# Patient Record
Sex: FEMALE | URBAN - METROPOLITAN AREA
[De-identification: names, ages, dates, MRNs, and addresses within clinical notes are randomized per-mention and may not be internally consistent; named-entity substitution may affect disease eponyms.]

---

## 2019-03-04 ENCOUNTER — APPOINTMENT (RX ONLY)
Dept: URBAN - METROPOLITAN AREA CLINIC 106 | Facility: CLINIC | Age: 49
Setting detail: DERMATOLOGY
End: 2019-03-04

## 2019-03-04 DIAGNOSIS — L23.9 ALLERGIC CONTACT DERMATITIS, UNSPECIFIED CAUSE: ICD-10-CM

## 2019-03-04 DIAGNOSIS — L71.8 OTHER ROSACEA: ICD-10-CM

## 2019-03-04 PROCEDURE — 99213 OFFICE O/P EST LOW 20 MIN: CPT

## 2019-03-04 PROCEDURE — ? COUNSELING

## 2019-03-04 PROCEDURE — ? PRESCRIPTION

## 2019-03-04 PROCEDURE — ? ORDER TESTS

## 2019-03-04 PROCEDURE — ? TREATMENT REGIMEN

## 2019-03-04 RX ORDER — IVERMECTIN 10 MG/G
CREAM TOPICAL
Qty: 1 | Refills: 5 | Status: ERX | COMMUNITY
Start: 2019-03-04

## 2019-03-04 RX ORDER — PIMECROLIMUS 10 MG/G
CREAM TOPICAL
Qty: 1 | Refills: 5 | Status: ERX | COMMUNITY
Start: 2019-03-04

## 2019-03-04 RX ORDER — DOXYCYCLINE 40 MG/1
CAPSULE ORAL
Qty: 30 | Refills: 5 | Status: ERX | COMMUNITY
Start: 2019-03-04

## 2019-03-04 RX ADMIN — IVERMECTIN: 10 CREAM TOPICAL at 00:00

## 2019-03-04 RX ADMIN — DOXYCYCLINE: 40 CAPSULE ORAL at 00:00

## 2019-03-04 RX ADMIN — PIMECROLIMUS: 10 CREAM TOPICAL at 00:00

## 2019-03-04 ASSESSMENT — LOCATION ZONE DERM: LOCATION ZONE: FACE

## 2019-03-04 ASSESSMENT — LOCATION SIMPLE DESCRIPTION DERM: LOCATION SIMPLE: LEFT CHEEK

## 2019-03-04 ASSESSMENT — LOCATION DETAILED DESCRIPTION DERM: LOCATION DETAILED: LEFT MEDIAL MALAR CHEEK

## 2019-03-04 NOTE — PROCEDURE: TREATMENT REGIMEN
Detail Level: Zone
Initiate Treatment: Soolantra 1 % topical cream  Sig: Apply topically to face once daily\\nOracea 40 mg capsule,immediate - delay release  Sig: Take one tablet by once daily
Plan: 3 weeks plan patch testing
Initiate Treatment: Elidel 1 % topical cream  Sig: Apply to eyelids and neck twice daily as needed

## 2019-03-04 NOTE — PROCEDURE: ORDER TESTS
Lab Facility: [id number]
Performing Laboratory: [id number]
Billing Type: Third-Party Bill
Expected Date Of Service: 03/04/2019
Bill For Surgical Tray: no

## 2020-04-01 ENCOUNTER — APPOINTMENT (RX ONLY)
Dept: URBAN - METROPOLITAN AREA CLINIC 106 | Facility: CLINIC | Age: 50
Setting detail: DERMATOLOGY
End: 2020-04-01

## 2020-04-01 DIAGNOSIS — L82.1 OTHER SEBORRHEIC KERATOSIS: ICD-10-CM

## 2020-04-01 PROCEDURE — ? TELEHEALTH ASSESSMENT

## 2020-04-01 PROCEDURE — ? COUNSELING

## 2020-04-01 PROCEDURE — 99213 OFFICE O/P EST LOW 20 MIN: CPT | Mod: 95

## 2020-04-01 ASSESSMENT — LOCATION DETAILED DESCRIPTION DERM: LOCATION DETAILED: LEFT LATERAL BREAST 4-5:00 REGION

## 2020-04-01 ASSESSMENT — LOCATION ZONE DERM: LOCATION ZONE: TRUNK

## 2020-04-01 ASSESSMENT — LOCATION SIMPLE DESCRIPTION DERM: LOCATION SIMPLE: LEFT BREAST

## 2020-04-01 ASSESSMENT — PAIN INTENSITY VAS: HOW INTENSE IS YOUR PAIN 0 BEING NO PAIN, 10 BEING THE MOST SEVERE PAIN POSSIBLE?: NO PAIN

## 2020-04-01 NOTE — HPI: SKIN LESION
Is This A New Presentation, Or A Follow-Up?: Skin Lesion
What Type Of Note Output Would You Prefer (Optional)?: Bullet Format
How Severe Is Your Skin Lesion?: mild
Has Your Skin Lesion Been Treated?: not been treated
Additional History: Surgeon wanted to have this spot looked at.

## 2023-04-19 ENCOUNTER — APPOINTMENT (RX ONLY)
Dept: URBAN - METROPOLITAN AREA CLINIC 114 | Facility: CLINIC | Age: 53
Setting detail: DERMATOLOGY
End: 2023-04-19

## 2023-04-19 DIAGNOSIS — L82.1 OTHER SEBORRHEIC KERATOSIS: ICD-10-CM

## 2023-04-19 DIAGNOSIS — L81.4 OTHER MELANIN HYPERPIGMENTATION: ICD-10-CM

## 2023-04-19 DIAGNOSIS — D22 MELANOCYTIC NEVI: ICD-10-CM

## 2023-04-19 DIAGNOSIS — Z71.89 OTHER SPECIFIED COUNSELING: ICD-10-CM

## 2023-04-19 DIAGNOSIS — Z41.9 ENCOUNTER FOR PROCEDURE FOR PURPOSES OTHER THAN REMEDYING HEALTH STATE, UNSPECIFIED: ICD-10-CM

## 2023-04-19 DIAGNOSIS — L91.8 OTHER HYPERTROPHIC DISORDERS OF THE SKIN: ICD-10-CM

## 2023-04-19 DIAGNOSIS — D18.0 HEMANGIOMA: ICD-10-CM

## 2023-04-19 DIAGNOSIS — L71.8 OTHER ROSACEA: ICD-10-CM | Status: WORSENING

## 2023-04-19 DIAGNOSIS — D49.2 NEOPLASM OF UNSPECIFIED BEHAVIOR OF BONE, SOFT TISSUE, AND SKIN: ICD-10-CM

## 2023-04-19 DIAGNOSIS — L57.8 OTHER SKIN CHANGES DUE TO CHRONIC EXPOSURE TO NONIONIZING RADIATION: ICD-10-CM

## 2023-04-19 DIAGNOSIS — Z12.83 ENCOUNTER FOR SCREENING FOR MALIGNANT NEOPLASM OF SKIN: ICD-10-CM

## 2023-04-19 PROBLEM — D18.01 HEMANGIOMA OF SKIN AND SUBCUTANEOUS TISSUE: Status: ACTIVE | Noted: 2023-04-19

## 2023-04-19 PROBLEM — D22.5 MELANOCYTIC NEVI OF TRUNK: Status: ACTIVE | Noted: 2023-04-19

## 2023-04-19 PROCEDURE — ? COSMETIC CONSULTATION: BBL

## 2023-04-19 PROCEDURE — ? PRESCRIPTION MEDICATION MANAGEMENT

## 2023-04-19 PROCEDURE — ? COUNSELING

## 2023-04-19 PROCEDURE — ? ADDITIONAL NOTES

## 2023-04-19 PROCEDURE — ? RECOMMENDATIONS

## 2023-04-19 PROCEDURE — ? BIOPSY BY SHAVE METHOD

## 2023-04-19 PROCEDURE — 99204 OFFICE O/P NEW MOD 45 MIN: CPT | Mod: 25

## 2023-04-19 PROCEDURE — ? PRESCRIPTION

## 2023-04-19 PROCEDURE — 11103 TANGNTL BX SKIN EA SEP/ADDL: CPT

## 2023-04-19 PROCEDURE — 11102 TANGNTL BX SKIN SINGLE LES: CPT

## 2023-04-19 RX ORDER — OXYMETAZOLINE HYDROCHLORIDE 1 G/100G
CREAM TOPICAL
Qty: 30 | Refills: 6 | Status: ERX | COMMUNITY
Start: 2023-04-19

## 2023-04-19 RX ADMIN — OXYMETAZOLINE HYDROCHLORIDE: 1 CREAM TOPICAL at 00:00

## 2023-04-19 ASSESSMENT — LOCATION DETAILED DESCRIPTION DERM
LOCATION DETAILED: LEFT CENTRAL MALAR CHEEK
LOCATION DETAILED: RIGHT CENTRAL MALAR CHEEK
LOCATION DETAILED: RIGHT RIB CAGE
LOCATION DETAILED: LEFT POSTERIOR SHOULDER
LOCATION DETAILED: LEFT ANTERIOR LATERAL PROXIMAL UPPER ARM
LOCATION DETAILED: LEFT INFERIOR LATERAL LOWER BACK
LOCATION DETAILED: LEFT INFERIOR UPPER BACK
LOCATION DETAILED: RIGHT SUPERIOR LATERAL MIDBACK
LOCATION DETAILED: LEFT INFERIOR LATERAL NECK
LOCATION DETAILED: RIGHT CHIN
LOCATION DETAILED: LEFT INFERIOR ANTERIOR NECK
LOCATION DETAILED: LEFT LATERAL BREAST 1-2:00 REGION

## 2023-04-19 ASSESSMENT — LOCATION SIMPLE DESCRIPTION DERM
LOCATION SIMPLE: LEFT BREAST
LOCATION SIMPLE: RIGHT CHEEK
LOCATION SIMPLE: LEFT UPPER BACK
LOCATION SIMPLE: CHIN
LOCATION SIMPLE: LEFT LOWER BACK
LOCATION SIMPLE: LEFT CHEEK
LOCATION SIMPLE: ABDOMEN
LOCATION SIMPLE: LEFT SHOULDER
LOCATION SIMPLE: LEFT ANTERIOR NECK
LOCATION SIMPLE: LEFT UPPER ARM
LOCATION SIMPLE: RIGHT LOWER BACK

## 2023-04-19 ASSESSMENT — LOCATION ZONE DERM
LOCATION ZONE: NECK
LOCATION ZONE: TRUNK
LOCATION ZONE: ARM
LOCATION ZONE: FACE

## 2023-04-19 NOTE — PROCEDURE: RECOMMENDATIONS
Recommendation Preamble: The following recommendations were made during the visit: BBL/JAYLA and Rhofade
Render Risk Assessment In Note?: no
Detail Level: Zone

## 2023-04-19 NOTE — PROCEDURE: ADDITIONAL NOTES
Render Risk Assessment In Note?: no
Additional Notes: 04/19/23\\nDiscussed BBL treatment for rosacea. I recommend 5 sessions for the face, monthly.
Detail Level: Simple

## 2023-04-19 NOTE — PROCEDURE: BIOPSY BY SHAVE METHOD

## 2023-04-19 NOTE — PROCEDURE: PRESCRIPTION MEDICATION MANAGEMENT
Initiate Treatment: Rhofade 1 % topical cream \\nQuantity: 30.0 g  Days Supply: 30\\nSig: Apply to face QAM
Detail Level: Zone
Render In Strict Bullet Format?: No

## 2023-04-26 ENCOUNTER — APPOINTMENT (RX ONLY)
Dept: URBAN - METROPOLITAN AREA CLINIC 114 | Facility: CLINIC | Age: 53
Setting detail: DERMATOLOGY
End: 2023-04-26

## 2023-04-26 DIAGNOSIS — D485 NEOPLASM OF UNCERTAIN BEHAVIOR OF SKIN: ICD-10-CM

## 2023-04-26 PROBLEM — D48.5 NEOPLASM OF UNCERTAIN BEHAVIOR OF SKIN: Status: ACTIVE | Noted: 2023-04-26

## 2023-04-26 PROCEDURE — ? EXCISION

## 2023-04-26 PROCEDURE — 13121 CMPLX RPR S/A/L 2.6-7.5 CM: CPT

## 2023-04-26 PROCEDURE — 11403 EXC TR-EXT B9+MARG 2.1-3CM: CPT

## 2023-04-26 PROCEDURE — ? PRESCRIPTION

## 2023-04-26 RX ORDER — MUPIROCIN 20 MG/G
OINTMENT TOPICAL
Qty: 22 | Refills: 1 | Status: ERX | COMMUNITY
Start: 2023-04-26

## 2023-04-26 RX ADMIN — MUPIROCIN: 20 OINTMENT TOPICAL at 00:00

## 2023-04-26 ASSESSMENT — LOCATION ZONE DERM: LOCATION ZONE: ARM

## 2023-04-26 ASSESSMENT — LOCATION DETAILED DESCRIPTION DERM: LOCATION DETAILED: LEFT ANTERIOR LATERAL PROXIMAL UPPER ARM

## 2023-04-26 ASSESSMENT — LOCATION SIMPLE DESCRIPTION DERM: LOCATION SIMPLE: LEFT UPPER ARM

## 2023-04-26 NOTE — PROCEDURE: EXCISION

## 2023-05-10 ENCOUNTER — APPOINTMENT (RX ONLY)
Dept: URBAN - METROPOLITAN AREA CLINIC 114 | Facility: CLINIC | Age: 53
Setting detail: DERMATOLOGY
End: 2023-05-10

## 2023-05-10 DIAGNOSIS — Z48.817 ENCOUNTER FOR SURGICAL AFTERCARE FOLLOWING SURGERY ON THE SKIN AND SUBCUTANEOUS TISSUE: ICD-10-CM

## 2023-05-10 PROCEDURE — 99024 POSTOP FOLLOW-UP VISIT: CPT

## 2023-05-10 PROCEDURE — ? SUTURE REMOVAL (GLOBAL PERIOD)

## 2023-05-10 ASSESSMENT — LOCATION DETAILED DESCRIPTION DERM: LOCATION DETAILED: LEFT PROXIMAL POSTERIOR UPPER ARM

## 2023-05-10 ASSESSMENT — LOCATION SIMPLE DESCRIPTION DERM: LOCATION SIMPLE: LEFT POSTERIOR UPPER ARM

## 2023-05-10 ASSESSMENT — LOCATION ZONE DERM: LOCATION ZONE: ARM

## 2023-05-10 NOTE — PROCEDURE: SUTURE REMOVAL (GLOBAL PERIOD)
Detail Level: Detailed
Add 38978 Cpt? (Important Note: In 2017 The Use Of 01699 Is Being Tracked By Cms To Determine Future Global Period Reimbursement For Global Periods): yes

## 2023-10-26 ENCOUNTER — APPOINTMENT (RX ONLY)
Dept: URBAN - METROPOLITAN AREA CLINIC 114 | Facility: CLINIC | Age: 53
Setting detail: DERMATOLOGY
End: 2023-10-26

## 2023-10-26 DIAGNOSIS — Z71.89 OTHER SPECIFIED COUNSELING: ICD-10-CM

## 2023-10-26 DIAGNOSIS — L81.4 OTHER MELANIN HYPERPIGMENTATION: ICD-10-CM

## 2023-10-26 DIAGNOSIS — Z87.2 PERSONAL HISTORY OF DISEASES OF THE SKIN AND SUBCUTANEOUS TISSUE: ICD-10-CM

## 2023-10-26 DIAGNOSIS — L82.1 OTHER SEBORRHEIC KERATOSIS: ICD-10-CM

## 2023-10-26 DIAGNOSIS — L57.8 OTHER SKIN CHANGES DUE TO CHRONIC EXPOSURE TO NONIONIZING RADIATION: ICD-10-CM

## 2023-10-26 DIAGNOSIS — B07.8 OTHER VIRAL WARTS: ICD-10-CM

## 2023-10-26 DIAGNOSIS — D18.0 HEMANGIOMA: ICD-10-CM

## 2023-10-26 DIAGNOSIS — Z12.83 ENCOUNTER FOR SCREENING FOR MALIGNANT NEOPLASM OF SKIN: ICD-10-CM

## 2023-10-26 DIAGNOSIS — D22 MELANOCYTIC NEVI: ICD-10-CM

## 2023-10-26 PROBLEM — D22.5 MELANOCYTIC NEVI OF TRUNK: Status: ACTIVE | Noted: 2023-10-26

## 2023-10-26 PROBLEM — D18.01 HEMANGIOMA OF SKIN AND SUBCUTANEOUS TISSUE: Status: ACTIVE | Noted: 2023-10-26

## 2023-10-26 PROCEDURE — ? COUNSELING

## 2023-10-26 PROCEDURE — 99214 OFFICE O/P EST MOD 30 MIN: CPT

## 2023-10-26 ASSESSMENT — LOCATION SIMPLE DESCRIPTION DERM
LOCATION SIMPLE: ABDOMEN
LOCATION SIMPLE: LEFT CALF
LOCATION SIMPLE: RIGHT LOWER BACK
LOCATION SIMPLE: LEFT UPPER BACK
LOCATION SIMPLE: LEFT SHOULDER
LOCATION SIMPLE: RIGHT CALF

## 2023-10-26 ASSESSMENT — LOCATION DETAILED DESCRIPTION DERM
LOCATION DETAILED: RIGHT DISTAL CALF
LOCATION DETAILED: LEFT SUPERIOR UPPER BACK
LOCATION DETAILED: LEFT DISTAL CALF
LOCATION DETAILED: RIGHT RIB CAGE
LOCATION DETAILED: LEFT INFERIOR UPPER BACK
LOCATION DETAILED: RIGHT SUPERIOR LATERAL MIDBACK
LOCATION DETAILED: LEFT POSTERIOR SHOULDER

## 2023-10-26 ASSESSMENT — LOCATION ZONE DERM
LOCATION ZONE: TRUNK
LOCATION ZONE: ARM
LOCATION ZONE: LEG

## 2024-05-07 ENCOUNTER — APPOINTMENT (RX ONLY)
Dept: URBAN - METROPOLITAN AREA CLINIC 114 | Facility: CLINIC | Age: 54
Setting detail: DERMATOLOGY
End: 2024-05-07

## 2024-05-07 DIAGNOSIS — L57.8 OTHER SKIN CHANGES DUE TO CHRONIC EXPOSURE TO NONIONIZING RADIATION: ICD-10-CM

## 2024-05-07 DIAGNOSIS — D22 MELANOCYTIC NEVI: ICD-10-CM

## 2024-05-07 DIAGNOSIS — Z87.2 PERSONAL HISTORY OF DISEASES OF THE SKIN AND SUBCUTANEOUS TISSUE: ICD-10-CM

## 2024-05-07 DIAGNOSIS — L81.4 OTHER MELANIN HYPERPIGMENTATION: ICD-10-CM

## 2024-05-07 DIAGNOSIS — Z12.83 ENCOUNTER FOR SCREENING FOR MALIGNANT NEOPLASM OF SKIN: ICD-10-CM

## 2024-05-07 DIAGNOSIS — D49.2 NEOPLASM OF UNSPECIFIED BEHAVIOR OF BONE, SOFT TISSUE, AND SKIN: ICD-10-CM

## 2024-05-07 DIAGNOSIS — L71.8 OTHER ROSACEA: ICD-10-CM | Status: IMPROVED

## 2024-05-07 DIAGNOSIS — D18.0 HEMANGIOMA: ICD-10-CM

## 2024-05-07 DIAGNOSIS — L82.1 OTHER SEBORRHEIC KERATOSIS: ICD-10-CM

## 2024-05-07 DIAGNOSIS — Z71.89 OTHER SPECIFIED COUNSELING: ICD-10-CM

## 2024-05-07 PROBLEM — D22.5 MELANOCYTIC NEVI OF TRUNK: Status: ACTIVE | Noted: 2024-05-07

## 2024-05-07 PROBLEM — D18.01 HEMANGIOMA OF SKIN AND SUBCUTANEOUS TISSUE: Status: ACTIVE | Noted: 2024-05-07

## 2024-05-07 PROCEDURE — 99214 OFFICE O/P EST MOD 30 MIN: CPT | Mod: 25

## 2024-05-07 PROCEDURE — ? PRESCRIPTION MEDICATION MANAGEMENT

## 2024-05-07 PROCEDURE — ? COUNSELING

## 2024-05-07 PROCEDURE — ? BIOPSY BY SHAVE METHOD

## 2024-05-07 PROCEDURE — 11102 TANGNTL BX SKIN SINGLE LES: CPT

## 2024-05-07 ASSESSMENT — LOCATION DETAILED DESCRIPTION DERM
LOCATION DETAILED: LEFT POSTERIOR SHOULDER
LOCATION DETAILED: LEFT INFERIOR UPPER BACK
LOCATION DETAILED: LEFT ANTERIOR LATERAL PROXIMAL UPPER ARM
LOCATION DETAILED: RIGHT DISTAL LATERAL CALF
LOCATION DETAILED: LEFT INFERIOR MEDIAL FOREHEAD
LOCATION DETAILED: RIGHT RIB CAGE
LOCATION DETAILED: LEFT INFERIOR CENTRAL MALAR CHEEK
LOCATION DETAILED: RIGHT SUPERIOR LATERAL MIDBACK

## 2024-05-07 ASSESSMENT — LOCATION ZONE DERM
LOCATION ZONE: ARM
LOCATION ZONE: LEG
LOCATION ZONE: FACE
LOCATION ZONE: TRUNK

## 2024-05-07 ASSESSMENT — LOCATION SIMPLE DESCRIPTION DERM
LOCATION SIMPLE: LEFT UPPER BACK
LOCATION SIMPLE: LEFT FOREHEAD
LOCATION SIMPLE: LEFT UPPER ARM
LOCATION SIMPLE: LEFT CHEEK
LOCATION SIMPLE: ABDOMEN
LOCATION SIMPLE: LEFT SHOULDER
LOCATION SIMPLE: RIGHT CALF
LOCATION SIMPLE: RIGHT LOWER BACK

## 2024-05-07 NOTE — PROCEDURE: PRESCRIPTION MEDICATION MANAGEMENT
Continue Regimen: Rhofade cream daily as needed for redness
Detail Level: Zone
Render In Strict Bullet Format?: No

## 2024-05-07 NOTE — PROCEDURE: BIOPSY BY SHAVE METHOD
Detail Level: Detailed
Depth Of Biopsy: dermis
Was A Bandage Applied: Yes
Size Of Lesion In Cm: 0
Biopsy Type: H and E
Biopsy Method: Dermablade
Anesthesia Type: 1% lidocaine with epinephrine
Anesthesia Volume In Cc: 0.5
Hemostasis: Drysol
Wound Care: Petrolatum
Dressing: bandage
Destruction After The Procedure: No
Type Of Destruction Used: Curettage
Curettage Text: The wound bed was treated with curettage after the biopsy was performed.
Cryotherapy Text: The wound bed was treated with cryotherapy after the biopsy was performed.
Electrodesiccation Text: The wound bed was treated with electrodesiccation after the biopsy was performed.
Electrodesiccation And Curettage Text: The wound bed was treated with electrodesiccation and curettage after the biopsy was performed.
Silver Nitrate Text: The wound bed was treated with silver nitrate after the biopsy was performed.
Lab: [id number]
Lab Facility: [id number]
Consent: Written consent was obtained and risks were reviewed including but not limited to scarring, infection, bleeding, scabbing, incomplete removal, nerve damage and allergy to anesthesia.
Post-Care Instructions: I reviewed with the patient in detail post-care instructions. Patient is to keep the biopsy site dry overnight, and then apply bacitracin twice daily until healed. Patient may apply hydrogen peroxide soaks to remove any crusting.
Notification Instructions: Patient will be notified of biopsy results. However, patient instructed to call the office if not contacted within 2 weeks.
Billing Type: Third-Party Bill
Information: Selecting Yes will display possible errors in your note based on the variables you have selected. This validation is only offered as a suggestion for you. PLEASE NOTE THAT THE VALIDATION TEXT WILL BE REMOVED WHEN YOU FINALIZE YOUR NOTE. IF YOU WANT TO FAX A PRELIMINARY NOTE YOU WILL NEED TO TOGGLE THIS TO 'NO' IF YOU DO NOT WANT IT IN YOUR FAXED NOTE.

## 2024-11-07 ENCOUNTER — APPOINTMENT (RX ONLY)
Dept: URBAN - METROPOLITAN AREA CLINIC 114 | Facility: CLINIC | Age: 54
Setting detail: DERMATOLOGY
End: 2024-11-07

## 2024-11-07 DIAGNOSIS — D22 MELANOCYTIC NEVI: ICD-10-CM

## 2024-11-07 DIAGNOSIS — L82.1 OTHER SEBORRHEIC KERATOSIS: ICD-10-CM

## 2024-11-07 DIAGNOSIS — L20.89 OTHER ATOPIC DERMATITIS: ICD-10-CM

## 2024-11-07 DIAGNOSIS — L57.8 OTHER SKIN CHANGES DUE TO CHRONIC EXPOSURE TO NONIONIZING RADIATION: ICD-10-CM

## 2024-11-07 DIAGNOSIS — L81.4 OTHER MELANIN HYPERPIGMENTATION: ICD-10-CM

## 2024-11-07 DIAGNOSIS — Z12.83 ENCOUNTER FOR SCREENING FOR MALIGNANT NEOPLASM OF SKIN: ICD-10-CM

## 2024-11-07 DIAGNOSIS — Z71.89 OTHER SPECIFIED COUNSELING: ICD-10-CM

## 2024-11-07 DIAGNOSIS — D18.0 HEMANGIOMA: ICD-10-CM

## 2024-11-07 PROBLEM — D22.5 MELANOCYTIC NEVI OF TRUNK: Status: ACTIVE | Noted: 2024-11-07

## 2024-11-07 PROBLEM — D18.01 HEMANGIOMA OF SKIN AND SUBCUTANEOUS TISSUE: Status: ACTIVE | Noted: 2024-11-07

## 2024-11-07 PROCEDURE — ? COUNSELING

## 2024-11-07 PROCEDURE — 99214 OFFICE O/P EST MOD 30 MIN: CPT

## 2024-11-07 PROCEDURE — ? PRESCRIPTION

## 2024-11-07 RX ORDER — CLOBETASOL PROPIONATE 0.5 MG/G
OINTMENT TOPICAL TWICE A DAY
Qty: 60 | Refills: 5 | Status: ERX | COMMUNITY
Start: 2024-11-07

## 2024-11-07 RX ADMIN — CLOBETASOL PROPIONATE: 0.5 OINTMENT TOPICAL at 00:00

## 2024-11-07 ASSESSMENT — LOCATION DETAILED DESCRIPTION DERM
LOCATION DETAILED: RIGHT RIB CAGE
LOCATION DETAILED: RIGHT MEDIAL UPPER BACK
LOCATION DETAILED: LEFT POSTERIOR SHOULDER
LOCATION DETAILED: RIGHT SUPERIOR LATERAL MIDBACK
LOCATION DETAILED: LEFT INFERIOR UPPER BACK
LOCATION DETAILED: RIGHT MEDIAL UPPER BACK

## 2024-11-07 ASSESSMENT — LOCATION SIMPLE DESCRIPTION DERM
LOCATION SIMPLE: RIGHT LOWER BACK
LOCATION SIMPLE: LEFT UPPER BACK
LOCATION SIMPLE: ABDOMEN
LOCATION SIMPLE: RIGHT UPPER BACK
LOCATION SIMPLE: LEFT SHOULDER
LOCATION SIMPLE: RIGHT UPPER BACK

## 2024-11-07 ASSESSMENT — LOCATION ZONE DERM
LOCATION ZONE: TRUNK
LOCATION ZONE: ARM
LOCATION ZONE: TRUNK